# Patient Record
Sex: FEMALE | Race: WHITE | NOT HISPANIC OR LATINO | URBAN - METROPOLITAN AREA
[De-identification: names, ages, dates, MRNs, and addresses within clinical notes are randomized per-mention and may not be internally consistent; named-entity substitution may affect disease eponyms.]

---

## 2024-02-29 NOTE — PROGRESS NOTES
"Subjective:      Patient ID: Brenda Kim is referred by No ref. provider found     Chief Complaint:  PCOS    History of Present Illness    Brenda Kim is a 23 y.o. female who presents for evaluation of PCOS.     Moved from 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   Was following with OBGYN for her PCOS.       Recently seen by her PCP and was concerned about buffalo hump and stretch marks.  Also interested starting GLP-1 RA.  Has history of hypertriglyceridemia.    PCOS  Reportedly had ovarian cysts - Dx at 13 years.   Irregular menstrual cycles - once every 6 months.   Insulin resistance, was started on metformin  Reported had male pattern hair growth.   Previously tried spironolactone - had increased urination.     Menstrual History/Obstetric:   Frequency of cycle: Regular cyles on the OCP  OCP use: Current use, since 13 years.   Obstetric history: G0  Menarche: at 6 years    Hyperandrogenism Symptoms:   Hirsutism:  Age of onset: 13 years, Stable - "comes and goes"  Virilization - Denies  Cosmetic measures:  Plucks as needed  Acne:  Occasional lesions.  Accutane use around 2014  Scalp hair loss:  Yes    Androgen Labs:   Reports elevated testosterone in the past.       Weight:     Baseline weight around 160 lbs few years back.  Height 5'5"    Weight trend:  Weight gain of about 10 lbs in the past year.  Reports having a buffalo hump and stretch marks.  Dietary interventions: Mediterranean diet.   Previous meds: Denies  Physical activity: 2 times a week works out at the gym and walks 2 x a week for 30 mins.       Imaging:   Ultrasound pelvis:  Not available for review          Family Hx:   Hx of PCOS: Mother and maternal aunt  Hx of infertility: Mother had trouble conceiving for about 9 years  Diabetes: Father, paternal grandparent.   CAD: Stroke in maternal grandmother    Hyperlipidemia - runs in the family.   Maternal aunt had hypopituitarism and empty sella.     Grandmother had hyperthyroidism abnd " thyroidectomy, unknown if it was cancer.   Denies pancreatitis or personal/FH of medullary thyroid cancer/MEN syndrome.         History of hypertriglyceridemia    Currently not on treatment.            Pre diabetes    H/o insulin resistance, started on metformin  mg daily.  Does reports some nausea on the medication.        ROS:   As above    Objective:     /85 (BP Location: Left arm, Patient Position: Sitting, BP Method: Large (Automatic))   Pulse 79   Wt 82.5 kg (181 lb 14.1 oz)   SpO2 96%     There is no height or weight on file to calculate BMI.    Physical Exam  Constitutional:       General: She is not in acute distress.     Appearance: She is not ill-appearing.   HENT:      Head: Normocephalic.   Eyes:      Conjunctiva/sclera: Conjunctivae normal.   Neck:      Comments: Minimal fat pad on the posterior neck  Cardiovascular:      Rate and Rhythm: Normal rate.   Pulmonary:      Effort: Pulmonary effort is normal.   Abdominal:      General: Abdomen is flat.      Palpations: Abdomen is soft.      Comments: No striae   Musculoskeletal:      Cervical back: Neck supple.   Skin:     General: Skin is warm and dry.   Neurological:      Mental Status: She is alert and oriented to person, place, and time.         Lab Review:   Lab Results   Component Value Date    HGBA1C 5.2 06/07/2023     Lab Results   Component Value Date    CHOL 180 06/14/2023    HDL 37 (L) 06/14/2023    LDLCALC 91 06/14/2023    TRIG 312 (H) 06/14/2023         Assessment and Plan     Problem List Items Addressed This Visit          Cardiac/Vascular    Hypertriglyceridemia       Triglycerides of 880 in 06/2023.  Improved to 312 after 1 week on retesting.  Currently not on medications.  Encouraged good diet and lifestyle modification.  Discussed risk of pancreatitis with hypertriglyceridemia.  Repeat fasting lipid.            Endocrine    PCOS (polycystic ovarian syndrome) - Primary       Diagnosed with PCOS around 13 years of  age.  Reportedly had ovarian cysts, irregular menstrual cycle, insulin resistance and male pattern hair growth.  Reports elevated testosterone in the past.    Stable hirsutism.  Tried spironolactone but had increased urination and discontinued.  Patient is currently on OCP.    Reports 10 lb weight gain in the past year.  BMI of 30.    Discussed risk of ASCVD, diabetes, hyperlipidemia and HTN.   Has history of prediabetes and hypertriglyceridemia.  Currently on metformin  mg daily.              Prediabetes       A1c of 5.7 in 06/2022.  Recent A1c was normal.  On metformin  mg daily, reports occasional nausea.  Monitor periodically.  Consider referral to the diabetes educator.           Class 1 obesity with body mass index (BMI) of 30.0 to 30.9 in adult       Reports weight gain of 10 lb in the past year.  Has history of PCOS and insulin resistance.  Check thyroid function test and DST.    Encouraged good diet and lifestyle modification.  Patient is interested in a GLP 1 RA.    Discussed risk of pancreatitis, GI side effects and concerns medullary thyroid cancer.               Silvia NIETO Rai, MD

## 2024-03-01 ENCOUNTER — OFFICE VISIT (OUTPATIENT)
Dept: ENDOCRINOLOGY | Facility: CLINIC | Age: 24
End: 2024-03-01
Payer: COMMERCIAL

## 2024-03-01 VITALS
OXYGEN SATURATION: 96 % | WEIGHT: 181.88 LBS | SYSTOLIC BLOOD PRESSURE: 125 MMHG | HEART RATE: 79 BPM | DIASTOLIC BLOOD PRESSURE: 85 MMHG

## 2024-03-01 DIAGNOSIS — R63.5 WEIGHT GAIN: ICD-10-CM

## 2024-03-01 DIAGNOSIS — R73.03 PREDIABETES: ICD-10-CM

## 2024-03-01 DIAGNOSIS — E28.2 PCOS (POLYCYSTIC OVARIAN SYNDROME): ICD-10-CM

## 2024-03-01 DIAGNOSIS — E66.09 CLASS 1 OBESITY DUE TO EXCESS CALORIES WITH SERIOUS COMORBIDITY AND BODY MASS INDEX (BMI) OF 30.0 TO 30.9 IN ADULT: ICD-10-CM

## 2024-03-01 DIAGNOSIS — R73.03 PREDIABETES: Primary | ICD-10-CM

## 2024-03-01 DIAGNOSIS — E78.1 HYPERTRIGLYCERIDEMIA: ICD-10-CM

## 2024-03-01 DIAGNOSIS — E28.2 PCOS (POLYCYSTIC OVARIAN SYNDROME): Primary | ICD-10-CM

## 2024-03-01 PROBLEM — Z83.49 FAMILY HISTORY OF HASHIMOTO THYROIDITIS: Status: ACTIVE | Noted: 2022-05-31

## 2024-03-01 PROBLEM — F90.9 ATTENTION DEFICIT HYPERACTIVITY DISORDER (ADHD): Status: ACTIVE | Noted: 2022-05-31

## 2024-03-01 PROBLEM — L65.0 TELOGEN EFFLUVIUM: Status: ACTIVE | Noted: 2022-04-14

## 2024-03-01 PROBLEM — K21.00 CHRONIC REFLUX ESOPHAGITIS: Status: ACTIVE | Noted: 2020-08-07

## 2024-03-01 PROCEDURE — 99204 OFFICE O/P NEW MOD 45 MIN: CPT | Mod: S$GLB,,, | Performed by: INTERNAL MEDICINE

## 2024-03-01 PROCEDURE — 3079F DIAST BP 80-89 MM HG: CPT | Mod: CPTII,S$GLB,, | Performed by: INTERNAL MEDICINE

## 2024-03-01 PROCEDURE — 3074F SYST BP LT 130 MM HG: CPT | Mod: CPTII,S$GLB,, | Performed by: INTERNAL MEDICINE

## 2024-03-01 PROCEDURE — 99999 PR PBB SHADOW E&M-NEW PATIENT-LVL III: CPT | Mod: PBBFAC,,, | Performed by: INTERNAL MEDICINE

## 2024-03-01 PROCEDURE — 1160F RVW MEDS BY RX/DR IN RCRD: CPT | Mod: CPTII,S$GLB,, | Performed by: INTERNAL MEDICINE

## 2024-03-01 PROCEDURE — 1159F MED LIST DOCD IN RCRD: CPT | Mod: CPTII,S$GLB,, | Performed by: INTERNAL MEDICINE

## 2024-03-01 RX ORDER — CETIRIZINE HYDROCHLORIDE 10 MG/1
10 TABLET ORAL
COMMUNITY
Start: 2023-08-07

## 2024-03-01 RX ORDER — BUPROPION HYDROCHLORIDE 150 MG/1
TABLET ORAL
COMMUNITY
Start: 2024-01-10

## 2024-03-01 RX ORDER — FAMOTIDINE 40 MG/5ML
POWDER, FOR SUSPENSION ORAL
COMMUNITY

## 2024-03-01 RX ORDER — DROSPIRENONE AND ETHINYL ESTRADIOL 0.02-3(28)
KIT ORAL
COMMUNITY
Start: 2023-11-09

## 2024-03-01 RX ORDER — EPINEPHRINE 0.3 MG/.3ML
INJECTION SUBCUTANEOUS
COMMUNITY
Start: 2023-09-19

## 2024-03-01 RX ORDER — LISDEXAMFETAMINE DIMESYLATE 50 MG/1
50 CAPSULE ORAL EVERY MORNING
COMMUNITY

## 2024-03-01 RX ORDER — CYANOCOBALAMIN 1000 UG/ML
1000 INJECTION, SOLUTION INTRAMUSCULAR; SUBCUTANEOUS
COMMUNITY
Start: 2023-06-14

## 2024-03-01 RX ORDER — BUSPIRONE HYDROCHLORIDE 15 MG/1
15 TABLET ORAL DAILY PRN
COMMUNITY
Start: 2023-03-08

## 2024-03-01 RX ORDER — METFORMIN HYDROCHLORIDE 500 MG/1
500 TABLET, EXTENDED RELEASE ORAL DAILY
COMMUNITY
Start: 2023-08-07

## 2024-03-05 ENCOUNTER — PATIENT MESSAGE (OUTPATIENT)
Dept: ENDOCRINOLOGY | Facility: CLINIC | Age: 24
End: 2024-03-05
Payer: COMMERCIAL

## 2024-03-05 ENCOUNTER — LAB VISIT (OUTPATIENT)
Dept: LAB | Facility: OTHER | Age: 24
End: 2024-03-05
Attending: INTERNAL MEDICINE
Payer: COMMERCIAL

## 2024-03-05 DIAGNOSIS — E78.1 HYPERTRIGLYCERIDEMIA: ICD-10-CM

## 2024-03-05 DIAGNOSIS — R73.03 PREDIABETES: ICD-10-CM

## 2024-03-05 PROBLEM — E66.9 CLASS 1 OBESITY WITH BODY MASS INDEX (BMI) OF 30.0 TO 30.9 IN ADULT: Status: ACTIVE | Noted: 2024-03-05

## 2024-03-05 LAB
CHOLEST SERPL-MCNC: 159 MG/DL (ref 120–199)
CHOLEST/HDLC SERPL: 4.2 {RATIO} (ref 2–5)
ESTIMATED AVG GLUCOSE: 103 MG/DL (ref 68–131)
HBA1C MFR BLD: 5.2 % (ref 4–5.6)
HDLC SERPL-MCNC: 38 MG/DL (ref 40–75)
HDLC SERPL: 23.9 % (ref 20–50)
LDLC SERPL CALC-MCNC: ABNORMAL MG/DL (ref 63–159)
NONHDLC SERPL-MCNC: 121 MG/DL
T4 FREE SERPL-MCNC: 0.83 NG/DL (ref 0.71–1.51)
TRIGL SERPL-MCNC: 444 MG/DL (ref 30–150)
TSH SERPL DL<=0.005 MIU/L-ACNC: 5.49 UIU/ML (ref 0.4–4)

## 2024-03-05 PROCEDURE — 80061 LIPID PANEL: CPT | Performed by: INTERNAL MEDICINE

## 2024-03-05 PROCEDURE — 36415 COLL VENOUS BLD VENIPUNCTURE: CPT | Performed by: INTERNAL MEDICINE

## 2024-03-05 PROCEDURE — 84443 ASSAY THYROID STIM HORMONE: CPT | Performed by: INTERNAL MEDICINE

## 2024-03-05 PROCEDURE — 84439 ASSAY OF FREE THYROXINE: CPT | Performed by: INTERNAL MEDICINE

## 2024-03-05 PROCEDURE — 83036 HEMOGLOBIN GLYCOSYLATED A1C: CPT | Performed by: INTERNAL MEDICINE

## 2024-03-05 RX ORDER — DEXAMETHASONE 1 MG/1
1 TABLET ORAL ONCE
Qty: 1 TABLET | Refills: 0 | Status: SHIPPED | OUTPATIENT
Start: 2024-03-05 | End: 2024-03-05

## 2024-03-05 NOTE — ASSESSMENT & PLAN NOTE
Diagnosed with PCOS around 13 years of age.  Reportedly had ovarian cysts, irregular menstrual cycle, insulin resistance and male pattern hair growth.  Reports elevated testosterone in the past.    Stable hirsutism.  Tried spironolactone but had increased urination and discontinued.  Patient is currently on OCP.    Reports 10 lb weight gain in the past year.  BMI of 30.    Discussed risk of ASCVD, diabetes, hyperlipidemia and HTN.   Has history of prediabetes and hypertriglyceridemia.  Currently on metformin  mg daily.

## 2024-03-05 NOTE — ASSESSMENT & PLAN NOTE
Triglycerides of 880 in 06/2023.  Improved to 312 after 1 week on retesting.  Currently not on medications.  Encouraged good diet and lifestyle modification.  Discussed risk of pancreatitis with hypertriglyceridemia.  Repeat fasting lipid.

## 2024-03-05 NOTE — ASSESSMENT & PLAN NOTE
Reports weight gain of 10 lb in the past year.  Has history of PCOS and insulin resistance.  Check thyroid function test and DST.    Encouraged good diet and lifestyle modification.  Patient is interested in a GLP 1 RA.    Discussed risk of pancreatitis, GI side effects and concerns medullary thyroid cancer.

## 2024-03-05 NOTE — ASSESSMENT & PLAN NOTE
A1c of 5.7 in 06/2022.  Recent A1c was normal.  On metformin  mg daily, reports occasional nausea.  Monitor periodically.  Consider referral to the diabetes educator.

## 2024-03-06 DIAGNOSIS — E03.8 SUBCLINICAL HYPOTHYROIDISM: Primary | ICD-10-CM

## 2024-03-06 DIAGNOSIS — E66.09 CLASS 1 OBESITY DUE TO EXCESS CALORIES WITH SERIOUS COMORBIDITY AND BODY MASS INDEX (BMI) OF 30.0 TO 30.9 IN ADULT: ICD-10-CM

## 2024-03-06 DIAGNOSIS — E78.1 HYPERTRIGLYCERIDEMIA: ICD-10-CM

## 2024-03-06 RX ORDER — LEVOTHYROXINE SODIUM 25 UG/1
25 TABLET ORAL DAILY
Qty: 30 TABLET | Refills: 3 | Status: SHIPPED | OUTPATIENT
Start: 2024-03-06

## 2024-03-07 ENCOUNTER — LAB VISIT (OUTPATIENT)
Dept: LAB | Facility: OTHER | Age: 24
End: 2024-03-07
Attending: INTERNAL MEDICINE
Payer: COMMERCIAL

## 2024-03-07 DIAGNOSIS — E28.2 PCOS (POLYCYSTIC OVARIAN SYNDROME): ICD-10-CM

## 2024-03-07 DIAGNOSIS — E66.09 CLASS 1 OBESITY DUE TO EXCESS CALORIES WITH SERIOUS COMORBIDITY AND BODY MASS INDEX (BMI) OF 30.0 TO 30.9 IN ADULT: ICD-10-CM

## 2024-03-07 DIAGNOSIS — R63.5 WEIGHT GAIN: ICD-10-CM

## 2024-03-07 LAB — CORTIS SERPL-MCNC: <1 UG/DL (ref 4.3–22.4)

## 2024-03-07 PROCEDURE — 82542 COL CHROMOTOGRAPHY QUAL/QUAN: CPT | Performed by: INTERNAL MEDICINE

## 2024-03-07 PROCEDURE — 36415 COLL VENOUS BLD VENIPUNCTURE: CPT | Performed by: INTERNAL MEDICINE

## 2024-03-07 PROCEDURE — 82533 TOTAL CORTISOL: CPT | Performed by: INTERNAL MEDICINE

## 2024-03-16 LAB — DEXAMETHASONE SERPL-MCNC: 283 NG/DL

## 2024-04-24 ENCOUNTER — PATIENT MESSAGE (OUTPATIENT)
Dept: ENDOCRINOLOGY | Facility: CLINIC | Age: 24
End: 2024-04-24
Payer: COMMERCIAL